# Patient Record
Sex: MALE | Race: WHITE | Employment: OTHER | URBAN - METROPOLITAN AREA
[De-identification: names, ages, dates, MRNs, and addresses within clinical notes are randomized per-mention and may not be internally consistent; named-entity substitution may affect disease eponyms.]

---

## 2017-10-13 ENCOUNTER — APPOINTMENT (OUTPATIENT)
Dept: CV DIAGNOSTICS | Facility: HOSPITAL | Age: 72
DRG: 308 | End: 2017-10-13
Attending: INTERNAL MEDICINE
Payer: MEDICARE

## 2017-10-13 ENCOUNTER — APPOINTMENT (OUTPATIENT)
Dept: GENERAL RADIOLOGY | Facility: HOSPITAL | Age: 72
DRG: 308 | End: 2017-10-13
Attending: INTERNAL MEDICINE
Payer: MEDICARE

## 2017-10-13 ENCOUNTER — HOSPITAL ENCOUNTER (INPATIENT)
Facility: HOSPITAL | Age: 72
LOS: 1 days | Discharge: ANOTHER HOSPITAL | DRG: 308 | End: 2017-10-13
Attending: EMERGENCY MEDICINE | Admitting: INTERNAL MEDICINE
Payer: MEDICARE

## 2017-10-13 ENCOUNTER — APPOINTMENT (OUTPATIENT)
Dept: GENERAL RADIOLOGY | Facility: HOSPITAL | Age: 72
DRG: 308 | End: 2017-10-13
Attending: EMERGENCY MEDICINE
Payer: MEDICARE

## 2017-10-13 ENCOUNTER — APPOINTMENT (OUTPATIENT)
Dept: CT IMAGING | Facility: HOSPITAL | Age: 72
DRG: 308 | End: 2017-10-13
Attending: EMERGENCY MEDICINE
Payer: MEDICARE

## 2017-10-13 VITALS
HEART RATE: 78 BPM | SYSTOLIC BLOOD PRESSURE: 110 MMHG | WEIGHT: 185 LBS | DIASTOLIC BLOOD PRESSURE: 95 MMHG | HEIGHT: 70 IN | TEMPERATURE: 98 F | RESPIRATION RATE: 18 BRPM | BODY MASS INDEX: 26.48 KG/M2 | OXYGEN SATURATION: 100 %

## 2017-10-13 DIAGNOSIS — Z79.01 ANTICOAGULATED: ICD-10-CM

## 2017-10-13 DIAGNOSIS — I42.9 CARDIOMYOPATHY, UNSPECIFIED TYPE (HCC): ICD-10-CM

## 2017-10-13 DIAGNOSIS — I46.9 CARDIAC ARREST (HCC): Primary | ICD-10-CM

## 2017-10-13 PROBLEM — G47.33 OSA (OBSTRUCTIVE SLEEP APNEA): Chronic | Status: ACTIVE | Noted: 2017-10-13

## 2017-10-13 PROBLEM — I50.9 CHF (CONGESTIVE HEART FAILURE) (HCC): Chronic | Status: ACTIVE | Noted: 2017-10-13

## 2017-10-13 PROBLEM — I48.91 A-FIB (HCC): Status: ACTIVE | Noted: 2017-10-13

## 2017-10-13 PROBLEM — N18.9 CKD (CHRONIC KIDNEY DISEASE): Chronic | Status: ACTIVE | Noted: 2017-10-13

## 2017-10-13 PROBLEM — E11.9 DIABETES (HCC): Chronic | Status: ACTIVE | Noted: 2017-10-13

## 2017-10-13 PROBLEM — E05.90 HYPERTHYROIDISM: Chronic | Status: ACTIVE | Noted: 2017-10-13

## 2017-10-13 PROCEDURE — 71010 XR CHEST AP/PA (1 VIEW) (CPT=71010): CPT | Performed by: EMERGENCY MEDICINE

## 2017-10-13 PROCEDURE — 93306 TTE W/DOPPLER COMPLETE: CPT | Performed by: INTERNAL MEDICINE

## 2017-10-13 PROCEDURE — 70450 CT HEAD/BRAIN W/O DYE: CPT | Performed by: EMERGENCY MEDICINE

## 2017-10-13 PROCEDURE — 99291 CRITICAL CARE FIRST HOUR: CPT | Performed by: INTERNAL MEDICINE

## 2017-10-13 RX ORDER — BACITRACIN 500 [USP'U]/G
OINTMENT TOPICAL DAILY
COMMUNITY

## 2017-10-13 RX ORDER — HYDROCODONE BITARTRATE AND ACETAMINOPHEN 5; 325 MG/1; MG/1
1 TABLET ORAL EVERY 6 HOURS PRN
Status: DISCONTINUED | OUTPATIENT
Start: 2017-10-13 | End: 2017-10-14

## 2017-10-13 RX ORDER — METHIMAZOLE 10 MG/1
20 TABLET ORAL 3 TIMES DAILY
Status: DISCONTINUED | OUTPATIENT
Start: 2017-10-13 | End: 2017-10-14

## 2017-10-13 RX ORDER — ONDANSETRON 2 MG/ML
4 INJECTION INTRAMUSCULAR; INTRAVENOUS ONCE
Status: COMPLETED | OUTPATIENT
Start: 2017-10-13 | End: 2017-10-13

## 2017-10-13 RX ORDER — ALFUZOSIN HYDROCHLORIDE 10 MG/1
10 TABLET, EXTENDED RELEASE ORAL
Status: DISCONTINUED | OUTPATIENT
Start: 2017-10-14 | End: 2017-10-14

## 2017-10-13 RX ORDER — PANTOPRAZOLE SODIUM 40 MG/1
40 TABLET, DELAYED RELEASE ORAL
Status: DISCONTINUED | OUTPATIENT
Start: 2017-10-14 | End: 2017-10-14

## 2017-10-13 RX ORDER — DEXTROSE MONOHYDRATE 25 G/50ML
50 INJECTION, SOLUTION INTRAVENOUS
Status: DISCONTINUED | OUTPATIENT
Start: 2017-10-13 | End: 2017-10-14

## 2017-10-13 RX ORDER — AMIODARONE HYDROCHLORIDE 200 MG/1
400 TABLET ORAL DAILY
Status: DISCONTINUED | OUTPATIENT
Start: 2017-10-13 | End: 2017-10-13

## 2017-10-13 RX ORDER — ASPIRIN 81 MG/1
81 TABLET, CHEWABLE ORAL DAILY
Status: DISCONTINUED | OUTPATIENT
Start: 2017-10-13 | End: 2017-10-13

## 2017-10-13 RX ORDER — MELATONIN
325
Status: DISCONTINUED | OUTPATIENT
Start: 2017-10-13 | End: 2017-10-14

## 2017-10-13 RX ORDER — BUMETANIDE 1 MG/1
3 TABLET ORAL 2 TIMES DAILY
COMMUNITY

## 2017-10-13 RX ORDER — ASPIRIN 81 MG/1
TABLET, CHEWABLE ORAL DAILY
COMMUNITY

## 2017-10-13 RX ORDER — POLYETHYLENE GLYCOL 3350 17 G/17G
17 POWDER, FOR SOLUTION ORAL DAILY
Status: DISCONTINUED | OUTPATIENT
Start: 2017-10-13 | End: 2017-10-14

## 2017-10-13 RX ORDER — MEXILETINE HYDROCHLORIDE 200 MG/1
200 CAPSULE ORAL 3 TIMES DAILY
Status: DISCONTINUED | OUTPATIENT
Start: 2017-10-13 | End: 2017-10-13

## 2017-10-13 RX ORDER — PREDNISONE 1 MG/1
15 TABLET ORAL 2 TIMES DAILY
COMMUNITY

## 2017-10-13 RX ORDER — MELATONIN
325
COMMUNITY

## 2017-10-13 RX ORDER — TAMSULOSIN HYDROCHLORIDE 0.4 MG/1
CAPSULE ORAL DAILY
COMMUNITY

## 2017-10-13 RX ORDER — ONDANSETRON 2 MG/ML
4 INJECTION INTRAMUSCULAR; INTRAVENOUS EVERY 6 HOURS PRN
Status: DISCONTINUED | OUTPATIENT
Start: 2017-10-13 | End: 2017-10-13

## 2017-10-13 RX ORDER — HYDROCODONE BITARTRATE AND ACETAMINOPHEN 5; 325 MG/1; MG/1
1 TABLET ORAL EVERY 6 HOURS PRN
COMMUNITY

## 2017-10-13 RX ORDER — POTASSIUM CHLORIDE 1.5 G/1.77G
40 POWDER, FOR SOLUTION ORAL DAILY
COMMUNITY

## 2017-10-13 RX ORDER — ATORVASTATIN CALCIUM 40 MG/1
40 TABLET, FILM COATED ORAL NIGHTLY
Status: DISCONTINUED | OUTPATIENT
Start: 2017-10-13 | End: 2017-10-14

## 2017-10-13 RX ORDER — INSULIN ASPART 100 [IU]/ML
INJECTION, SOLUTION INTRAVENOUS; SUBCUTANEOUS
Status: ON HOLD | COMMUNITY
End: 2017-10-13

## 2017-10-13 RX ORDER — AMIODARONE HYDROCHLORIDE 200 MG/1
200 TABLET ORAL 2 TIMES DAILY WITH MEALS
Status: DISCONTINUED | OUTPATIENT
Start: 2017-10-13 | End: 2017-10-14

## 2017-10-13 RX ORDER — ALBUTEROL SULFATE 2.5 MG/3ML
SOLUTION RESPIRATORY (INHALATION) EVERY 6 HOURS PRN
COMMUNITY

## 2017-10-13 RX ORDER — PANTOPRAZOLE SODIUM 40 MG/1
40 TABLET, DELAYED RELEASE ORAL
COMMUNITY

## 2017-10-13 RX ORDER — SPIRONOLACTONE 25 MG/1
12.5 TABLET ORAL DAILY
Status: DISCONTINUED | OUTPATIENT
Start: 2017-10-13 | End: 2017-10-13

## 2017-10-13 RX ORDER — BISACODYL 10 MG
10 SUPPOSITORY, RECTAL RECTAL DAILY
COMMUNITY

## 2017-10-13 RX ORDER — SPIRONOLACTONE 25 MG/1
12.5 TABLET ORAL DAILY
COMMUNITY

## 2017-10-13 RX ORDER — POLYETHYLENE GLYCOL 3350 17 G/17G
17 POWDER, FOR SOLUTION ORAL DAILY
COMMUNITY

## 2017-10-13 RX ORDER — METOCLOPRAMIDE 10 MG/1
10 TABLET ORAL EVERY 6 HOURS PRN
Status: DISCONTINUED | OUTPATIENT
Start: 2017-10-13 | End: 2017-10-14

## 2017-10-13 RX ORDER — SULFAMETHOXAZOLE AND TRIMETHOPRIM 800; 160 MG/1; MG/1
0.5 TABLET ORAL
COMMUNITY

## 2017-10-13 RX ORDER — METOCLOPRAMIDE HYDROCHLORIDE 5 MG/ML
10 INJECTION INTRAMUSCULAR; INTRAVENOUS EVERY 6 HOURS PRN
Status: DISCONTINUED | OUTPATIENT
Start: 2017-10-13 | End: 2017-10-14

## 2017-10-13 RX ORDER — ALBUTEROL SULFATE 2.5 MG/3ML
2.5 SOLUTION RESPIRATORY (INHALATION) EVERY 6 HOURS PRN
Status: DISCONTINUED | OUTPATIENT
Start: 2017-10-13 | End: 2017-10-14

## 2017-10-13 RX ORDER — MEXILETINE HYDROCHLORIDE 200 MG/1
200 CAPSULE ORAL EVERY 8 HOURS SCHEDULED
Status: DISCONTINUED | OUTPATIENT
Start: 2017-10-13 | End: 2017-10-14

## 2017-10-13 RX ORDER — ACETAMINOPHEN 325 MG/1
325 TABLET ORAL EVERY 6 HOURS PRN
Status: DISCONTINUED | OUTPATIENT
Start: 2017-10-13 | End: 2017-10-14

## 2017-10-13 RX ORDER — FUROSEMIDE 10 MG/ML
80 INJECTION INTRAMUSCULAR; INTRAVENOUS ONCE
Status: COMPLETED | OUTPATIENT
Start: 2017-10-13 | End: 2017-10-13

## 2017-10-13 RX ORDER — ATORVASTATIN CALCIUM 40 MG/1
40 TABLET, FILM COATED ORAL NIGHTLY
COMMUNITY

## 2017-10-13 RX ORDER — POTASSIUM CHLORIDE 1.5 G/1.77G
40 POWDER, FOR SOLUTION ORAL DAILY
Status: DISCONTINUED | OUTPATIENT
Start: 2017-10-13 | End: 2017-10-13

## 2017-10-13 RX ORDER — SODIUM CHLORIDE 9 MG/ML
125 INJECTION, SOLUTION INTRAVENOUS CONTINUOUS
Status: DISCONTINUED | OUTPATIENT
Start: 2017-10-13 | End: 2017-10-13

## 2017-10-13 RX ORDER — BISACODYL 10 MG
10 SUPPOSITORY, RECTAL RECTAL DAILY
Status: DISCONTINUED | OUTPATIENT
Start: 2017-10-13 | End: 2017-10-14

## 2017-10-13 RX ORDER — DIPHENHYDRAMINE HCL 25 MG
25 CAPSULE ORAL DAILY PRN
Status: DISCONTINUED | OUTPATIENT
Start: 2017-10-13 | End: 2017-10-14

## 2017-10-13 RX ORDER — AMIODARONE HYDROCHLORIDE 200 MG/1
400 TABLET ORAL DAILY
COMMUNITY

## 2017-10-13 RX ORDER — METHIMAZOLE 10 MG/1
20 TABLET ORAL 3 TIMES DAILY
COMMUNITY

## 2017-10-13 RX ORDER — DIPHENHYDRAMINE HCL 25 MG
25 CAPSULE ORAL DAILY PRN
COMMUNITY

## 2017-10-13 RX ORDER — MEXILETINE HYDROCHLORIDE 200 MG/1
200 CAPSULE ORAL 3 TIMES DAILY
COMMUNITY

## 2017-10-13 RX ORDER — DEXTROSE AND SODIUM CHLORIDE 5; .45 G/100ML; G/100ML
INJECTION, SOLUTION INTRAVENOUS CONTINUOUS
Status: DISCONTINUED | OUTPATIENT
Start: 2017-10-13 | End: 2017-10-13

## 2017-10-13 RX ORDER — ACETAMINOPHEN 325 MG/1
325 TABLET ORAL EVERY 6 HOURS PRN
COMMUNITY

## 2017-10-13 NOTE — CONSULTS
[facility]  Report of Consultation    Anjali Polo Patient Status:  Inpatient    1945 MRN [id number]   [facility] 6NE-A Attending Edvin Pathak MD   Hosp Day # 0 PCP PHYSICIAN NONSTAFF     Reason • Hyperlipidemia    • CHANDANA (obstructive sleep apnea)    • Pulmonary hypertension    • Thyroid disease      Past Surgical History:  No date: ABLATION      Comment: x 2   No date: ANGIOPLASTY (CORONARY)      Comment: 1 stent per patient at [facility]; had ablat Oral, Daily with breakfast  •  PEG 3350 (MIRALAX) powder packet 17 g, 17 g, Oral, Daily  •  atorvastatin (LIPITOR) tab 40 mg, 40 mg, Oral, Nightly  •  ferrous sulfate EC tab 325 mg, 325 mg, Oral, TID CC  •  predniSONE (DELTASONE) tab 15 mg, 15 mg, Oral, BI Data:  Diagnostics:  EKG: SR with 1st degree and RBBB  Labs:     Lab Results  Component Value Date   WBC 11.9 10/13/2017   RBC 3.43 10/13/2017   HGB 10.4 10/13/2017   HCT 33.7 10/13/2017   MCV 98.3 10/13/2017   MCH 30.3 10/13/2017   MCHC 30.9 10/13/2017

## 2017-10-13 NOTE — PROGRESS NOTES
10/13/17 1459   Clinical Encounter Type   Visited With Health care provider   Referral From Nurse   [facility]  for [employer] [facility])   Church Encounters   Spiritual Requests During Visit / Hospitalization [facility]

## 2017-10-13 NOTE — ED INITIAL ASSESSMENT (HPI)
Full arrest upon EMS arrival. Staff from [place] doing CPR with no pulse. Got a rhytm and pulse back.   Now A&O x3, awake, talking, c/o feeling tired

## 2017-10-13 NOTE — CM/SW NOTE
Per [facility] RN, family requesting pt to transfer to [facility] where he receives all his care. [facility] called [facility] [phone] and faxed face sheet there. [facility] was given 's number.   [address] Ambulance on will call [phone]

## 2017-10-13 NOTE — CONSULTS
[facility]    Report of Consultation    Eleonora [facility] Patient Status:  Emergency    1945 MRN [id number]   Location [address] [facility] Attending Josh Harrison MD   Hosp Day # 0 PCP PHYSICIAN NONSTAFF     Date of Admis that he has never smoked.  He has never used smokeless tobacco.    Allergies:  No Known Allergies    Medications:    Current Facility-Administered Medications:   •  0.9%  NaCl infusion, 125 mL/hr, Intravenous, Continuous    Review of Systems:  All systems w

## 2017-10-13 NOTE — CONSULTS
Pulmonary / Critical Care H&P/Consult       NAME: Laura Enter - ROOM: / - MRN: [id number] - Age: 71year old - :  1945    Date of Admission: 10/13/2017  9:53 AM  Admission Diagnosis: No admission diagnoses are documented for this encounter. (two) times daily. Disp:  Rfl:    PEG 3350 Oral Powd Pack Take 17 g by mouth daily. Disp:  Rfl:    Sulfamethoxazole-TMP -160 MG Oral Tab per tablet Take 1 tablet by mouth 2 (two) times daily.  Disp:  Rfl:    bumetanide 1 MG Oral Tab Take 1 mg by mouth nebulization every 6 (six) hours as needed for Wheezing. Disp:  Rfl:    acetaminophen 325 MG Oral Tab Take 325 mg by mouth every 6 (six) hours as needed for Pain.  Disp:  Rfl:        Scheduled Medication:   Continuous Infusing Medication:  • sodium chloride ----------]    Recent Labs   Lab  10/13/17   1007   ALT  93*   AST  37       Imaging: cxr: minimal L basilar atelectasis, otherwise clear    ASSESSMENT/PLAN:    1. Cardiac arrest: most likely triggered by arrhythmia vs vasovagal response.  ROSC with cpr o

## 2017-10-13 NOTE — H&P
[facility]                                                               History & Physical         Alok Clonts Patient Status:  Inpatient    1945 MRN [id number]   [facility] 6NE-A Attending Symone Gatica MD;[facility] cardiology team shows two stored events possible VT episodes that correlate with patient's syncopal events and was admitted to CCU      History:  Past Medical History:   Diagnosis Date   • A-fib [facility])    • [facility]    • Basal cell carcinoma predniSONE 5 MG Oral Tab Take 15 mg by mouth 2 (two) times daily. Disp:  Rfl:  Taking   aspirin 81 MG Oral Chew Tab Chew by mouth daily. Disp:  Rfl:  Taking   amiodarone HCl 200 MG Oral Tab Take 400 mg by mouth daily.  Disp:  Rfl:  Taking   cholecalcifero 97.3 °F (36.3 °C), temperature source Temporal, resp. rate 14, height 5' 10\" (1.778 m), weight 185 lb (83.9 kg), SpO2 100 %. General: No acute distress. Alert and oriented x 3. HEENT: Moist mucous membranes. EOM-I. PERRL  Neck: No lymphadenopathy.   [employer]) NRDR ([address]) which includes the Dose Index   Registry. PATIENT STATED HISTORY: (As transcribed by Technologist)  Patient was unresponsive this am with no pulse.          FINDINGS:       VENTRICLES/SULCI: successful resuscitation by paramedics and [facility] staff before coming to the hospital-we will monitor closely and cardiac Critical Care Unit , cardiologist and intensivist on consult.   Electrolytes including magnesium normal and potassium 5.3 require inpatient services that will reasonably be expected to span two midnight's based on the clinical documentation in H+P. Based on patients current state of illness, I anticipate that, after discharge, patient will require TBD.       Charo Wells MD

## 2017-10-13 NOTE — ED PROVIDER NOTES
Patient Seen in: [facility] Emergency Department    History   Patient presents with:  Cardiac Arrest (cardiovascular)    Stated Complaint: full arrest     HPI    28-year-old male with a history of atrial fibrillation on Eliquis.   He has CHF/cardiomy status: Never Smoker                                                              Smokeless tobacco: Never Used                      Alcohol use:  Yes              Comment: daily but Quit in 5/2017      Review of Systems    Positive for stated complaint: fu for the following:     PT 23.3 (*)     INR 2.03 (*)     All other components within normal limits   PRO BETA NATRIURETIC PEPTIDE - Abnormal; Notable for the following:     Pro-Beta Natriuretic Peptide 72,902 (*)     All other components within normal limit Narrative: The following orders were created for panel order CBC WITH DIFFERENTIAL WITH PLATELET.   Procedure                               Abnormality         Status                     ---------                               -----------         ------ ([facility] I46.9 10/13/2017 Unknown    Anticoagulated Z79.01 10/13/2017     Cardiomyopathy, unspecified type ([facility] I42.9 10/13/2017           A total of 35 minutes of critical care time (exclusive of billable procedures) was administered to manage the patient's

## 2017-10-13 NOTE — ICD/PM
[employer] single lead ICD interrogated. 2 stored events that correlate with syncopal event. Hard to tell if this if AFib or VT as no atrial lead. No shocks as below cutoff rate. Dr. Trina Leal or Dr. Shaun Durand will review.   Paulo Craft NP

## 2017-10-13 NOTE — PROGRESS NOTES
Reviewed with Nurse Favian Esparza. Will await EP review. Welch continuous diuretic therapy -- hold K+ supplements and aldactone for now. Hold aspirin and reduce Eliquis dose given extensive bruising and renal dysfunction.

## 2017-10-14 NOTE — PROGRESS NOTES
10/13/17 0640   Clinical Encounter Type   Visited With Patient   Routine Visit Introduction   Continue Visiting Yes  (Patient is a  and would like [place] and prayer. )   Patient's Supportive Strategies/Resources ( provided emotional

## 2017-10-14 NOTE — PLAN OF CARE
Received patient from er A+ox4, moving all extremities. Denies chest pain or sob at this time. States he does have mild rib pain. Voiding without difficulty and lasix gtt infusing per orders.  Dressing to lower legs changed by wound clinic yesterday, order

## 2017-10-14 NOTE — RESPIRATORY THERAPY NOTE
Patient has refused to wear a CPAP. Patient states he is being transferred to [facility].

## 2017-10-14 NOTE — PLAN OF CARE
Patient received, updated and call to transfer to [facility] for continued care. Nurse Bruno Leal was called report on the CCU. Patient not given oral medication stated wanted to take medication then he got to [facility].   Patient sent with [employer]

## 2017-11-20 ENCOUNTER — LAB REQUISITION (OUTPATIENT)
Dept: LAB | Facility: HOSPITAL | Age: 72
End: 2017-11-20
Payer: MEDICARE

## 2017-11-20 DIAGNOSIS — R50.9 FEVER: ICD-10-CM

## 2017-11-20 PROCEDURE — 80048 BASIC METABOLIC PNL TOTAL CA: CPT | Performed by: INTERNAL MEDICINE
